# Patient Record
Sex: FEMALE | Race: WHITE
[De-identification: names, ages, dates, MRNs, and addresses within clinical notes are randomized per-mention and may not be internally consistent; named-entity substitution may affect disease eponyms.]

---

## 2018-05-28 ENCOUNTER — HOSPITAL ENCOUNTER (EMERGENCY)
Age: 58
LOS: 1 days | Discharge: HOME | End: 2018-05-29

## 2018-05-28 ENCOUNTER — HOSPITAL ENCOUNTER (EMERGENCY)
Dept: HOSPITAL 91 - E/R | Age: 58
LOS: 1 days | Discharge: HOME | End: 2018-05-29
Payer: COMMERCIAL

## 2018-05-28 DIAGNOSIS — R40.2362: ICD-10-CM

## 2018-05-28 DIAGNOSIS — I10: ICD-10-CM

## 2018-05-28 DIAGNOSIS — R07.89: Primary | ICD-10-CM

## 2018-05-28 DIAGNOSIS — R40.2252: ICD-10-CM

## 2018-05-28 DIAGNOSIS — R40.2142: ICD-10-CM

## 2018-05-28 LAB
ABNORMAL IP MESSAGE: 1
ADD MAN DIFF?: YES
ANION GAP: 15 (ref 8–16)
BLOOD UREA NITROGEN: 21 MG/DL (ref 7–20)
CALCIUM: 8.7 MG/DL (ref 8.4–10.2)
CARBON DIOXIDE: 30 MMOL/L (ref 21–31)
CHLORIDE: 103 MMOL/L (ref 97–110)
CREATININE: 0.9 MG/DL (ref 0.44–1)
GLUCOSE: 114 MG/DL (ref 70–220)
HEMATOCRIT: 37.9 % (ref 37–47)
HEMOGLOBIN: 12.6 G/DL (ref 12–16)
MEAN CORPUSCULAR HEMOGLOBIN: 33.1 PG (ref 29–33)
MEAN CORPUSCULAR HGB CONC: 33.2 G/DL (ref 32–37)
MEAN CORPUSCULAR VOLUME: 99.5 FL (ref 82–101)
MEAN PLATELET VOLUME: 10.2 FL (ref 7.4–10.4)
NUCLEATED RED BLOOD CELLS%: 0 /100WBC (ref 0–0)
PLATELET COUNT: 241 10^3/UL (ref 140–415)
POSITIVE DIFF: (no result)
POTASSIUM: 3.8 MMOL/L (ref 3.5–5.1)
RED BLOOD COUNT: 3.81 10^6/UL (ref 4.2–5.4)
RED CELL DISTRIBUTION WIDTH: 12.4 % (ref 11.5–14.5)
SODIUM: 144 MMOL/L (ref 135–144)
TROPONIN-I: < 0.012 NG/ML (ref 0–0.12)
WHITE BLOOD COUNT: 10.2 10^3/UL (ref 4.8–10.8)

## 2018-05-28 PROCEDURE — 84484 ASSAY OF TROPONIN QUANT: CPT

## 2018-05-28 PROCEDURE — 36415 COLL VENOUS BLD VENIPUNCTURE: CPT

## 2018-05-28 PROCEDURE — 80048 BASIC METABOLIC PNL TOTAL CA: CPT

## 2018-05-28 PROCEDURE — 99285 EMERGENCY DEPT VISIT HI MDM: CPT

## 2018-05-28 PROCEDURE — 85025 COMPLETE CBC W/AUTO DIFF WBC: CPT

## 2018-05-28 PROCEDURE — 93005 ELECTROCARDIOGRAM TRACING: CPT

## 2018-05-28 PROCEDURE — 96374 THER/PROPH/DIAG INJ IV PUSH: CPT

## 2018-05-28 PROCEDURE — 71045 X-RAY EXAM CHEST 1 VIEW: CPT

## 2018-05-28 RX ADMIN — KETOROLAC TROMETHAMINE 1 MG: 15 INJECTION, SOLUTION INTRAMUSCULAR; INTRAVENOUS at 23:52

## 2018-05-29 LAB
BASOPHIL #: 0.1 10^3/UL (ref 0–0.1)
BASOPHILS %: 0.5 % (ref 0–2)
EOSINOPHILS #: 0.2 10^3/UL (ref 0–0.5)
EOSINOPHILS %: 1.7 % (ref 0–7)
LYMPHOCYTES #: 3 10^3/UL (ref 0.8–2.9)
LYMPHOCYTES %: 28.9 % (ref 15–51)
MONOCYTE #: 1.7 10^3/UL (ref 0.3–0.9)
MONOCYTES %: 16.1 % (ref 0–11)
NEUTROPHIL #: 5.4 10^3/UL (ref 1.6–7.5)
NEUTROPHILS %: 52.6 % (ref 39–77)
NUCLEATED RED BLOOD CELLS #: 0 10^3/UL (ref 0–0)

## 2019-03-08 ENCOUNTER — HOSPITAL ENCOUNTER (EMERGENCY)
Dept: HOSPITAL 91 - FTE | Age: 59
Discharge: HOME | End: 2019-03-08
Payer: COMMERCIAL

## 2019-03-08 ENCOUNTER — HOSPITAL ENCOUNTER (EMERGENCY)
Dept: HOSPITAL 10 - FTE | Age: 59
Discharge: HOME | End: 2019-03-08
Payer: COMMERCIAL

## 2019-03-08 VITALS — HEART RATE: 106 BPM | DIASTOLIC BLOOD PRESSURE: 71 MMHG | RESPIRATION RATE: 18 BRPM | SYSTOLIC BLOOD PRESSURE: 123 MMHG

## 2019-03-08 VITALS — BODY MASS INDEX: 52.04 KG/M2 | HEIGHT: 55 IN | WEIGHT: 224.87 LBS

## 2019-03-08 DIAGNOSIS — R50.9: Primary | ICD-10-CM

## 2019-03-08 DIAGNOSIS — I10: ICD-10-CM

## 2019-03-08 PROCEDURE — 99283 EMERGENCY DEPT VISIT LOW MDM: CPT

## 2019-03-08 RX ADMIN — ACETAMINOPHEN 1 MG: 325 TABLET, FILM COATED ORAL at 15:07

## 2019-03-08 NOTE — ERD
ER Documentation


Chief Complaint


Chief Complaint





FEVER WITH COUGH AND BODYACHE X 3 DAYS





HPI


58-year-old female presents with fever cough and body aches for the last 2-3 


days.  She denies vomiting, abdominal pain, diarrhea.  She has mild anterior 


chest pain but only with coughing.  She denies productive mucus.  Her grandson 


is ill with similar symptoms at home.





ROS


All systems reviewed and are negative except as per history of present illness.





Medications


Home Meds


Active Scripts


Oseltamivir Phosphate* (Tamiflu*) 75 Mg Capsule, 75 MG PO BID for 5 Days, CAP


   Prov:EDUARDO RIOS MD         3/8/19


Dextromethorphan Hb-Promethazine Hcl* (Promethazine DM* Syrup) 473 Ml Syrup, 5 


ML PO Q6 PRN for COUGH for 5 Days, ML


   Prov:EDUARDO RIOS MD         3/8/19


Acetaminophen* (Tylophen*) 500 Mg Capsule, 1 CAP PO Q6H PRN for PAIN AND OR 


ELEVATED TEMP, #20 CAP


   Prov:EDUARDO RIOS MD         3/8/19


Meclizine Hcl* (Antivert*) 25 Mg Tablet, 25 MG PO Q6H PRN for VOMITTING, #20 TAB


   Prov:ELBA NASCIMENTO MD         9/27/15


Reported Medications


Simvastatin (Simvastatin) 40 Mg Tablet, 40 MG PO HS, TAB


   9/27/15


Hydrochlorothiazide* (Hydrochlorothiazide*) 25 Mg Tab, 25 MG PO DAILY, TAB


   9/27/15


Benazepril Hcl* (Benazepril Hcl*) 10 Mg Tablet, 10 MG PO DAILY, TAB


   9/27/15





Allergies


Allergies:  


Coded Allergies:  


     No Known Allergy (Unverified , 9/27/15)





PMhx/Soc


History of Surgery:  Yes ()


Anesthesia Reaction:  No


Hx Neurological Disorder:  No


Hx Respiratory Disorders:  No


Hx Cardiac Disorders:  Yes (Hypertension, hyperlipidemia)


Hx Psychiatric Problems:  No


Hx Miscellaneous Medical Probl:  No


Hx Alcohol Use:  No


Hx Substance Use:  No


Hx Tobacco Use:  No


Smoking Status:  Never smoker





FmHx


Family History:  No diabetes, No coronary disease, No other





Physical Exam


Vitals





Vital Signs


  Date      Temp   Pulse  Resp  B/P (MAP)   Pulse Ox  O2         O2 Flow    FiO2


Time                                                  Delivery   Rate


    3/8/19  101.0    106    18      123/71        99


     12:50                            (88)





Physical Exam


Const:   No acute distress


Head:   Atraumatic 


Eyes:    Normal Conjunctiva


ENT:    Normal External Ears, Nose and Mouth.  TMs and oropharynx normal.


Neck:               Full range of motion. No meningismus.


Resp:   Clear to auscultation bilaterally.  Coarse cough without rales, wheezing


or retractions.


Cardio:   Regular rate and rhythm, no murmurs


Abd:    Soft, non tender, non distended. Normal bowel sounds


Skin:   No petechiae or rashes


Back:   No midline or flank tenderness


Ext:    No cyanosis, or edema


Neur:   Awake and alert


Psych:    Normal Mood and Affect


Results 24 hrs





Current Medications


 Medications
   Dose
          Sig/Taylor
       Start Time
   Status  Last


 (Trade)       Ordered        Route
 PRN     Stop Time              Admin
Dose


                              Reason                                Admin


                650 mg         ONCE  ONCE
    3/8/19                 



Acetaminophen                 PO
            15:00
 3/8/19



  (Tylenol                                  15:01


Tab)








Procedures/MDM


Patient was given Tylenol for fever.  Patient presents with fever and URI 


symptoms for last 2-3 days.  She has no signs of pneumonia, hypoxemia, 


respiratory stress, abdominal pain or cardiac chest pain.  She likely has a 


viral URI or influenza.  We will treat empirically with promethazine DM, fever 


control, Tamiflu, primary care follow-up and return precautions.  The patient 


was stable with no new complaints during the ER course. Clinically, there is no 


current evidence to suggest meningitis, sepsis, acute abdomen, pneumonia, 


stroke,  acute coronary syndrome, pulmonary embolism, aortic dissection or any 


other emergent condition appearing to require further evaluation or 


hospitalization.  Patient counseled regarding my diagnostic impression and care 


plan. Prior to discharge all questions answered. Pt agrees with treatment plan 


and understands strict return precautions. Pt is instructed to follow up with 


primary care provider within 24-48 hours. Precautionary instructions provided 


including instructions to return to the ER if not improving or for any worsening


or changing symptoms or concerns.





Departure


Diagnosis:  


   Primary Impression:  


   Fever


   Fever type:  unspecified  Qualified Codes:  R50.9 - Fever, unspecified


Condition:  Stable


Patient Instructions:  Fever Control (Adult), Uri, Viral, No Abx (Adult)


Referrals:  


DOCTOR,NOT ON STAFF (PCP)





Additional Instructions:  


Likely viral illness or influenza which may last 3-5 days.  Recheck for new or 


worsening symptoms with primary care doctor.











EDUARDO RIOS MD              Mar 8, 2019 14:55

## 2022-09-13 ENCOUNTER — OFFICE (OUTPATIENT)
Dept: URBAN - METROPOLITAN AREA CLINIC 67 | Facility: CLINIC | Age: 62
End: 2022-09-13

## 2022-09-13 VITALS
HEIGHT: 62 IN | DIASTOLIC BLOOD PRESSURE: 80 MMHG | TEMPERATURE: 97.6 F | SYSTOLIC BLOOD PRESSURE: 128 MMHG | WEIGHT: 226 LBS

## 2022-09-13 DIAGNOSIS — Z12.11: ICD-10-CM

## 2022-09-13 PROCEDURE — 99203 OFFICE O/P NEW LOW 30 MIN: CPT | Performed by: INTERNAL MEDICINE

## 2022-09-13 NOTE — SERVICEHPINOTES
Patient presents for a screening colonoscopy. There has been a previous colon screening 10 years ago and normal per pt. The patient has no family history of colon cancer or other significant GI diseases. Patient denies fever, nausea, vomiting, dysphagia, reflux, abdominal pain, change in bowel habits, constipation, diarrhea, rectal bleeding, melena, and significant change in weight. Denies shortness of breath and chest pain.